# Patient Record
Sex: MALE | Race: WHITE
[De-identification: names, ages, dates, MRNs, and addresses within clinical notes are randomized per-mention and may not be internally consistent; named-entity substitution may affect disease eponyms.]

---

## 2019-12-30 ENCOUNTER — HOSPITAL ENCOUNTER (OUTPATIENT)
Dept: HOSPITAL 95 - MHTC | Age: 69
Discharge: HOME | End: 2019-12-30
Attending: INTERNAL MEDICINE
Payer: OTHER GOVERNMENT

## 2019-12-30 VITALS — WEIGHT: 274.56 LBS | BODY MASS INDEX: 41.61 KG/M2 | HEIGHT: 67.99 IN

## 2019-12-30 DIAGNOSIS — I10: ICD-10-CM

## 2019-12-30 DIAGNOSIS — I25.719: ICD-10-CM

## 2019-12-30 DIAGNOSIS — Z79.4: ICD-10-CM

## 2019-12-30 DIAGNOSIS — I25.119: Primary | ICD-10-CM

## 2019-12-30 DIAGNOSIS — J44.9: ICD-10-CM

## 2019-12-30 DIAGNOSIS — Z88.6: ICD-10-CM

## 2019-12-30 DIAGNOSIS — E78.5: ICD-10-CM

## 2019-12-30 DIAGNOSIS — Z88.1: ICD-10-CM

## 2019-12-30 DIAGNOSIS — Z88.5: ICD-10-CM

## 2019-12-30 DIAGNOSIS — Z79.899: ICD-10-CM

## 2019-12-30 DIAGNOSIS — Z88.8: ICD-10-CM

## 2019-12-30 DIAGNOSIS — Z87.891: ICD-10-CM

## 2019-12-30 DIAGNOSIS — Z88.0: ICD-10-CM

## 2019-12-30 DIAGNOSIS — E11.9: ICD-10-CM

## 2019-12-30 DIAGNOSIS — I25.2: ICD-10-CM

## 2019-12-30 DIAGNOSIS — E03.9: ICD-10-CM

## 2019-12-30 DIAGNOSIS — I25.82: ICD-10-CM

## 2019-12-30 DIAGNOSIS — Z88.4: ICD-10-CM

## 2019-12-30 LAB
ALBUMIN SERPL BCP-MCNC: 3.7 G/DL (ref 3.4–5)
ALBUMIN/GLOB SERPL: 0.9 {RATIO} (ref 0.8–1.8)
ALT SERPL W P-5'-P-CCNC: 18 U/L (ref 12–78)
ANION GAP SERPL CALCULATED.4IONS-SCNC: 5 MMOL/L (ref 6–16)
AST SERPL W P-5'-P-CCNC: 17 U/L (ref 12–37)
BASOPHILS # BLD AUTO: 0.08 K/MM3 (ref 0–0.23)
BASOPHILS NFR BLD AUTO: 1 % (ref 0–2)
BILIRUB SERPL-MCNC: 0.5 MG/DL (ref 0.1–1)
BUN SERPL-MCNC: 19 MG/DL (ref 8–24)
CALCIUM SERPL-MCNC: 9.2 MG/DL (ref 8.5–10.1)
CHLORIDE SERPL-SCNC: 108 MMOL/L (ref 98–108)
CO2 SERPL-SCNC: 28 MMOL/L (ref 21–32)
CREAT SERPL-MCNC: 0.86 MG/DL (ref 0.6–1.2)
DEPRECATED RDW RBC AUTO: 39.9 FL (ref 35.1–46.3)
EOSINOPHIL # BLD AUTO: 0.49 K/MM3 (ref 0–0.68)
EOSINOPHIL NFR BLD AUTO: 5 % (ref 0–6)
ERYTHROCYTE [DISTWIDTH] IN BLOOD BY AUTOMATED COUNT: 12.7 % (ref 11.7–14.2)
GLOBULIN SER CALC-MCNC: 4.2 G/DL (ref 2.2–4)
GLUCOSE SERPL-MCNC: 87 MG/DL (ref 70–99)
HCT VFR BLD AUTO: 48 % (ref 37–53)
HGB BLD-MCNC: 15.7 G/DL (ref 13.5–17.5)
IMM GRANULOCYTES # BLD AUTO: 0.05 K/MM3 (ref 0–0.1)
IMM GRANULOCYTES NFR BLD AUTO: 1 % (ref 0–1)
LYMPHOCYTES # BLD AUTO: 2.41 K/MM3 (ref 0.84–5.2)
LYMPHOCYTES NFR BLD AUTO: 24 % (ref 21–46)
MCHC RBC AUTO-ENTMCNC: 32.7 G/DL (ref 31.5–36.5)
MCV RBC AUTO: 86 FL (ref 80–100)
MONOCYTES # BLD AUTO: 0.62 K/MM3 (ref 0.16–1.47)
MONOCYTES NFR BLD AUTO: 6 % (ref 4–13)
NEUTROPHILS # BLD AUTO: 6.34 K/MM3 (ref 1.96–9.15)
NEUTROPHILS NFR BLD AUTO: 64 % (ref 41–73)
NRBC # BLD AUTO: 0 K/MM3 (ref 0–0.02)
NRBC BLD AUTO-RTO: 0 /100 WBC (ref 0–0.2)
PLATELET # BLD AUTO: 210 K/MM3 (ref 150–400)
POTASSIUM SERPL-SCNC: 4 MMOL/L (ref 3.5–5.5)
PROT SERPL-MCNC: 7.9 G/DL (ref 6.4–8.2)
PROTHROMBIN TIME: 10.4 SEC (ref 9.7–11.5)
SODIUM SERPL-SCNC: 141 MMOL/L (ref 136–145)

## 2019-12-30 PROCEDURE — C1894 INTRO/SHEATH, NON-LASER: HCPCS

## 2019-12-30 PROCEDURE — C1769 GUIDE WIRE: HCPCS

## 2019-12-30 PROCEDURE — B203YZZ PLAIN RADIOGRAPHY OF MULTIPLE CORONARY ARTERY BYPASS GRAFTS USING OTHER CONTRAST: ICD-10-PCS | Performed by: INTERNAL MEDICINE

## 2019-12-30 PROCEDURE — B201YZZ PLAIN RADIOGRAPHY OF MULTIPLE CORONARY ARTERIES USING OTHER CONTRAST: ICD-10-PCS | Performed by: INTERNAL MEDICINE

## 2019-12-30 PROCEDURE — 4A023N7 MEASUREMENT OF CARDIAC SAMPLING AND PRESSURE, LEFT HEART, PERCUTANEOUS APPROACH: ICD-10-PCS | Performed by: INTERNAL MEDICINE

## 2019-12-30 NOTE — NUR
IV DC'D, CATH INTACT. PT VERBALIZED UNDERSTANDING OF DC INSTRUCTIONS AND
FOLLOW UP INFO. OUT TO CAR VIA W/C.

## 2020-09-23 NOTE — NUR
SHIFT SUMMARY
 
PT ER ADMIT THIS SHIFT FOR CHF EXAC. PT ON 4L O2, LUNGS ARE DIMINISHED T/O. PT
COMPLAINED OF VERY MILD CHEST PAIN UPON ADMISSION 3 (0-10) SCALE, BUT PAIN WAS
QUICKLY RELIEVED AFTER RECEIVING NORCO PER EMAR ORDERS. PT MAIN PAIN COMPLAINT
WAS RELATED TO CHRONIC FOOT PAIN.  PT NSR ON TELE WITH NO ACUTE CHANGES,
HYPERTENSIVE UPON ADMISSION, BUT IMPROVED WITH REPEAT CHECK. PT WITHOUT N/V
AND SKIN IS WARM AND DRY. DYSPNEA WITH EXERTION, BUT RESP E/U AT REST.
TROPONIN'S SLIGHTLY BUMPED WITH REPEAT CHECK. PT CONTINUES TO DENY CHEST PAIN
SINCE RECEIVING NORCO AND STATES HE FEELS FINE. TELE IN PLACE WITH NO
CHANGES.  CHARGE RN CURTIS ZENG NOTIFIED OF BUMPED TROPONIN, PER DISSCUSION
SHE STATES NO NEED TO NOTIFY PROVIDER AT THIS TIME SINCE THERE HAVE OTHERWISE
BEEN NO CHANGES AND PT CONTINUES TO DENY CHEST PAIN.  AWARE OF PT PRIOR
COMPLAINTS OF CHEST PAIN. PT HAS BEEN A/OX4 PLESANT WITH CARE, MAKES NEEDS
KNOWN. APPETITE GOOD. PT HAS BEEN AWAKE MOST OF THE NIGHT WATCHING TV.
ADMISSION COMPLETE. BED IN LOWEST POSITION, CALL LIGHT WITHIN REACH.

## 2020-09-23 NOTE — NUR
SHIFT SUMMARY
PATIENT ALERT AND ORIENTED THIS SHIFT. PATIENT REPOSITIONS SELF IN BED.
PATIENT USES URINAL. PATIENT USES CALL LIGHT APPROPRIATELY. PATIENT SITTING UP
IN BED MOST OF THIS SHIFT WATCHING TELEVISION. PATIENT MEDICATED FOR PAIN
THROUGHOUT THIS SHIFT PER EMAR. PATIENT NAPPED SEVERAL HOURS THIS AFTERNOON.
PATIENT CURRENTLY SITTING UP IN BED WATCHING TELEVISION, EATING DINNER.

## 2020-09-24 NOTE — NUR
NIGHT SHIFT SUMMARY
 PT A/O X4 WITH GARBLED SPEECH. THIS IS NO CHANGE FROM DAY REPORT.
INCONT/CONTIENT TO BOWEL AND BLADDER. PT HAD 1 SMALL LIQUID BROWN BM
OVERNIGHT. BED ALARM PLACED. CURRENTLY  ASLEEP. MEDICATED FOR GENERALIZED PAIN
ONCE OVERNIGHT. 4 L O2 VIA NC MAINTAINING SATS AT 92% AND ABOVE. VSS. NO ACUTE
CHANGES. WILL CONTINUE TO MONITOR.

## 2020-09-24 NOTE — NUR
ASSUMED CARE
RECEIVED REPORT FROM JOCY WANG. ASSUMED CARE OF PT. RESTING COMFORTABLY AT THIS
TIME, NO ACUTE DISTRESS NOTED. O2 SATS STABLE, PT DENIES SOB. RESPS E/U.
DENIES NEEDS AT THIS TIME. CALL LIGHT, POSSESSIONS IN REACH, BED ALARMS ON.
WILL CONTINUE TO MONITOR.

## 2020-09-24 NOTE — NUR
NIGHT SHIFT SUMMARY
 PT A/O X4. HAS BEEN CALLING APPROPRIATELY. BED ALARM IS PLACED DUE TO RECENT
FALL YESTERDAY REPORTED BY PREVIOUS SHIFT. MEDICATED FOR PAIN PER EMAR.
CURRENTLY ON 3 L O2 VIA NC SATTING MAINTAINING SATS AT 92% OR ABOVE. SLEPT
WELL TONIGHT. VSS. NO ACUTE CHANGES.

## 2020-09-24 NOTE — NUR
SHIFT SUMMARY
 
PT IS AO. PT MEDICATED FOR PAIN X2 THIS SHIFT FOR CHRONIC PAIN IN KNEES, BACK,
LEGS. PT DENIES N/V. PT IS A BIT SOB ON EXERTION. PT IS A STANDBY ASSIST IN
ROOM. PT IS DIAPHORETIC THIS SHIFT, BUT AFEBRILE. PT IN SINUS RHYTHM WITH
BUNDLE BRANCH BLOCK AND PVC'S THIS SHIFT. APPETITE IS GOOD. PT SLEEPING IN BED
ON/OFF THROUGHOUT SHIFT. PT IS IN BED, CALL LIGHT IN REACH, LOW POSITION WITH
BED ALARM ON.

## 2020-09-25 NOTE — NUR
SHIFT SUMMARY
PT HAS HAD AN UNEVENTFUL NIGHT. NO S/S ACUTE DISTRESS NOTED. O2 SATS STABLE ON
CPAP WITH 4L/O2, BETWEEN 90-92%. RESPS E/U. NO C/O CP, PRESSURE, SOB. VSS.
WAS MONITORED EVERY 1-2 HOURS WITH NEEDS MET. DENIES NEEDS AT THIS TIME. CALL
LIGHT, POSSESSIONS IN REACH, WILL CONTINUE TO MONITOR.

## 2020-09-25 NOTE — NUR
DISCHARGE
 
DISCHARGE INSTRUCTIONS, MEDICATION LIST AND FOLLOW UP APPOINTMENT REVIEWED
WITH PT. QUESTIONS/CONCERNS ANSWERED. PT VERBALLY RESISTANT TO MAKING CHANGES
TO MEDICATIONS, SAYS HE WILL SPEAK WITH HIS PCP. DISCHARGED HOME VIA W/C BY [facility]